# Patient Record
Sex: MALE | Race: WHITE
[De-identification: names, ages, dates, MRNs, and addresses within clinical notes are randomized per-mention and may not be internally consistent; named-entity substitution may affect disease eponyms.]

---

## 2017-05-13 ENCOUNTER — HOSPITAL ENCOUNTER (EMERGENCY)
Dept: HOSPITAL 31 - C.ER | Age: 52
Discharge: HOME | End: 2017-05-13
Payer: COMMERCIAL

## 2017-05-13 VITALS — OXYGEN SATURATION: 95 %

## 2017-05-13 VITALS
TEMPERATURE: 98.5 F | DIASTOLIC BLOOD PRESSURE: 71 MMHG | SYSTOLIC BLOOD PRESSURE: 125 MMHG | HEART RATE: 70 BPM | RESPIRATION RATE: 16 BRPM

## 2017-05-13 VITALS — BODY MASS INDEX: 27.3 KG/M2

## 2017-05-13 DIAGNOSIS — J45.909: Primary | ICD-10-CM

## 2018-04-28 ENCOUNTER — HOSPITAL ENCOUNTER (INPATIENT)
Dept: HOSPITAL 14 - H.ER | Age: 53
LOS: 10 days | Discharge: HOME | DRG: 430 | End: 2018-05-08
Attending: PSYCHIATRY & NEUROLOGY | Admitting: PSYCHIATRY & NEUROLOGY
Payer: COMMERCIAL

## 2018-04-28 VITALS — OXYGEN SATURATION: 99 %

## 2018-04-28 VITALS — BODY MASS INDEX: 27.3 KG/M2

## 2018-04-28 DIAGNOSIS — F33.3: Primary | ICD-10-CM

## 2018-04-28 DIAGNOSIS — F41.8: ICD-10-CM

## 2018-04-28 DIAGNOSIS — I10: ICD-10-CM

## 2018-04-28 DIAGNOSIS — J45.909: ICD-10-CM

## 2018-04-28 LAB
ALBUMIN SERPL-MCNC: 4.3 G/DL (ref 3.5–5)
ALBUMIN/GLOB SERPL: 1.4 {RATIO} (ref 1–2.1)
ALT SERPL-CCNC: 37 U/L (ref 21–72)
AST SERPL-CCNC: 19 U/L (ref 17–59)
BASOPHILS # BLD AUTO: 0.1 K/UL (ref 0–0.2)
BASOPHILS NFR BLD: 0.7 % (ref 0–2)
BILIRUB UR-MCNC: NEGATIVE MG/DL
BUN SERPL-MCNC: 15 MG/DL (ref 9–20)
CALCIUM SERPL-MCNC: 9.3 MG/DL (ref 8.4–10.2)
COLOR UR: YELLOW
EOSINOPHIL # BLD AUTO: 0.1 K/UL (ref 0–0.7)
EOSINOPHIL NFR BLD: 1.3 % (ref 0–4)
ERYTHROCYTE [DISTWIDTH] IN BLOOD BY AUTOMATED COUNT: 13.5 % (ref 11.5–14.5)
GFR NON-AFRICAN AMERICAN: > 60
GLUCOSE UR STRIP-MCNC: (no result) MG/DL
HGB BLD-MCNC: 16.4 G/DL (ref 12–18)
LEUKOCYTE ESTERASE UR-ACNC: (no result) LEU/UL
LYMPHOCYTES # BLD AUTO: 1.8 K/UL (ref 1–4.3)
LYMPHOCYTES NFR BLD AUTO: 17.2 % (ref 20–40)
MCH RBC QN AUTO: 29.8 PG (ref 27–31)
MCHC RBC AUTO-ENTMCNC: 33.8 G/DL (ref 33–37)
MCV RBC AUTO: 88.1 FL (ref 80–94)
MONOCYTES # BLD: 0.8 K/UL (ref 0–0.8)
MONOCYTES NFR BLD: 8.1 % (ref 0–10)
NEUTROPHILS # BLD: 7.5 K/UL (ref 1.8–7)
NEUTROPHILS NFR BLD AUTO: 72.7 % (ref 50–75)
NRBC BLD AUTO-RTO: 0.1 % (ref 0–0)
PH UR STRIP: 6 [PH] (ref 5–8)
PLATELET # BLD: 252 K/UL (ref 130–400)
PMV BLD AUTO: 8.8 FL (ref 7.2–11.7)
PROT UR STRIP-MCNC: NEGATIVE MG/DL
RBC # BLD AUTO: 5.51 MIL/UL (ref 4.4–5.9)
RBC # UR STRIP: NEGATIVE /UL
SP GR UR STRIP: 1.01 (ref 1–1.03)
URINE CLARITY: CLEAR
UROBILINOGEN UR-MCNC: 2 MG/DL (ref 0.2–1)
WBC # BLD AUTO: 10.3 K/UL (ref 4.8–10.8)

## 2018-04-28 PROCEDURE — GZHZZZZ GROUP PSYCHOTHERAPY: ICD-10-PCS | Performed by: PSYCHIATRY & NEUROLOGY

## 2018-04-28 PROCEDURE — GZ58ZZZ INDIVIDUAL PSYCHOTHERAPY, COGNITIVE-BEHAVIORAL: ICD-10-PCS | Performed by: PSYCHIATRY & NEUROLOGY

## 2018-04-28 NOTE — ED PDOC
HPI: Psych/Substance Abuse


Time Seen by Provider: 04/28/18 15:45


Chief Complaint (Nursing): Psychiatric Evaluation


Chief Complaint (Provider): Psychiatric Evaluation 


History/Exam Limitations: no limitations


Onset/Duration Of Symptoms: Other (3 weeks)


Current Symptoms Are (Timing): Still Present


Associated Symptoms: Depression


Additional Complaint(s): 


52 year old male presents to the ED for psychiatric evaluation for depression 

that began 3 weeks ago. Patient reports the following associated symptoms of 

not eating or sleeping and states "FBI is calling him". Other psychiatric 

symptoms: (+) paranoid thoughts, (+) hallucinations, (-) suicidal ideation, (-) 

homicidal ideation. Otherwise: (-) trauma, (-) fever, (-)headache, (-) dyspnea, 

(-) vomiting, (-) substance abuse, (-) patient intent of initiating a suicide 

attempt, (-) plan. 











Past Medical History


Reviewed: Historical Data, Nursing Documentation, Vital Signs


Vital Signs: 





 Last Vital Signs











Temp  98.4 F   04/28/18 15:36


 


Pulse  84   04/28/18 15:36


 


Resp  18   04/28/18 15:36


 


BP  165/93 H  04/28/18 15:36


 


Pulse Ox  99   04/28/18 15:36














- Medical History


PMH: Anxiety, Asthma, Depression, HTN


   Denies: Chronic Kidney Disease





- Family History


Family History: States: Unknown Family Hx





- Social History


Current smoker - smoking cessation education provided: No


Alcohol: Occasional


Drugs: Denies





- Immunization History


Hx Tetanus Toxoid Vaccination: No


Hx Influenza Vaccination: No


Hx Pneumococcal Vaccination: No





- Home Medications


Home Medications: 


 Ambulatory Orders











 Medication  Instructions  Recorded


 


Albuterol 0.083% [Albuterol 0.083% 2.5 mg IH Q6 PRN #20 neb 05/13/17





Inhal Sol (2.5 mg/3 ml) UD]  














- Allergies


Allergies/Adverse Reactions: 


 Allergies











Allergy/AdvReac Type Severity Reaction Status Date / Time


 


No Known Allergies Allergy   Verified 05/13/17 08:02














Review of Systems


ROS Statement: Except As Marked, All Systems Reviewed And Found Negative


Constitutional: Negative for: Fever, Other (trauma)


Respiratory: Negative for: Other (difficulty breathing)


Gastrointestinal: Negative for: Vomiting


Neurological: Positive for: Other (paranoid thoughts; hallucinations).  

Negative for: Headache


Psych: Negative for: Suicidal ideation (homicidal ideation)





Physical Exam





- Physical Exam


Comments: 


GENERAL APPEARANCE: Patient is awake, alert, oriented x 3, in no acute distress.


SKIN: Warm, dry; (-) cyanosis


HEAD: (-) scalp swelling, (-) scalp tenderness.


EYES: (-) conjunctival pallor, (-) scleral icterus, (-) nystagmus.


ENMT: Mucous membranes moist. Airway patent: (-) stridor.


NECK: (-) tenderness, (-) stiffness, (-) lymphadenopathy.


CHEST AND RESPIRATORY: (-) rales, (-) rhonchi, (-) wheezes; breath sounds equal.


ABDOMEN: Soft, (-) distention, (-) tenderness, (-) guarding.


NEURO AND PSYCH: Mental status as above.


Affect: Calm and cooperative. CNs: Intact. Pupils equal and reactive; EOMI; (-) 

facial asymmetry; tongue and uvula midline. Strength symmetric.











- Laboratory Results


Result Diagrams: 


 04/28/18 16:20





 04/28/18 16:20





- ECG


O2 Sat by Pulse Oximetry: 99 (RA)


Pulse Ox Interpretation: Normal





Medical Decision Making


Medical Decision Making: 


Time: 1617 


Initial Plan:


--EKG


--Alcohol serum 


--CMP


--Drug screen


--Crisis evaluation 


--CBC w/ differential 


--Chest one view 


--Urinalysis 


--Reevaluation 





Time: 1735


PROCEDURE:  CHEST RADIOGRAPH, 1 VIEW


FINDINGS:


LUNGS:


The lungs are well inflated.  There is mild pulmonary venous congestion. No 

focal consolidation.


PLEURA:


No pneumothorax or pleural fluid seen.


CARDIOVASCULAR:


Normal.


OSSEOUS STRUCTURES:


No significant abnormalities.


VISUALIZED UPPER ABDOMEN:


Normal.


OTHER FINDINGS:


None. 


IMPRESSION:


No active pulmonary disease.








EKG: NSR at 76 bpm, (-) acute ST changes, as read by PA. 





Labs reviewed and they are noted to be within normal limits.


Patient is medically cleared for crisis evaluation.


Patient was seen and evaluated by crisis.


After crisis evaluation, disposition was made for further inpatient treatment 

as per Dr. Wong for depression and hallucinations.


Patient and family is agreeable with plan and disposition. 


On re-evaluation, patient laying in bed comfortably in no acute distress.


--------------------------------------------------------------------------------

-----------------


Scribe Attestation:   


Documented by Norma Hawkins, acting as a scribe for Carmel Power PA-C





Provider Scribe Attestation:


All medical record entries made by the Scribe were at my direction and 

personally dictated by me. I have reviewed the chart and agree that the record 

accurately reflects my personal performance of the history, physical exam, 

medical decision making, and the department course for this patient. I have 

also personally directed, reviewed, and agree with the discharge instructions 

and disposition.








Disposition





- Clinical Impression


Clinical Impression: 


 Depression, Hallucination








- Patient ED Disposition


Is Patient to be Admitted: Yes


Counseled Patient/Family Regarding: Studies Performed, Diagnosis





- Disposition


Disposition Time: 18:00


Condition: STABLE


Forms:  [employer] (English)





- PA / NP / Resident Statement


MD/ has reviewed & agrees with the documentation as recorded.

## 2018-04-28 NOTE — RAD
PROCEDURE:  CHEST RADIOGRAPH, 1 VIEW



HISTORY:

psych eval



COMPARISON:

None available.



FINDINGS:



LUNGS:

The lungs are well inflated.  There is mild pulmonary venous 

congestion. No focal consolidation.



PLEURA:

No pneumothorax or pleural fluid seen.



CARDIOVASCULAR:

Normal.



OSSEOUS STRUCTURES:

No significant abnormalities.



VISUALIZED UPPER ABDOMEN:

Normal.



OTHER FINDINGS:

None. 



IMPRESSION:

No active pulmonary disease.

## 2018-04-28 NOTE — PCM.BM
<SandylisaJannasim P - Last Filed: 04/28/18 21:51>





Treatment Plan Problems





- Problems identified on initial assessmt


  ** Hopelessness/Helplessness


Date Initiated: 04/28/18


Time Initiated: 21:51


Assessment reference: NA


Status: Active





  ** Altered Sleep Patterns


Date Initiated: 04/28/18


Time Initiated: 21:51


Assessment reference: NA


Status: Active





Treatment assets and liabiliti


Patient Assests: physically healthy, good support system, negotiates basic needs

, cognitively intact


Patient Liabilities: relationship conflicts, language/speech





- Milieu Protocol


Maintain good personal hygiene: daily Encourage regular showers, daily Remind 

patient to perform daily oral care, daily Assist patient to perform ADL's


Conduct patient checks and document Observation sheet: Q15 minutes


Maintain personal safety: every shift Educate patient to report safety concerns 

to staff, every shift Monitor environment for contraband/sharps


Medication safety: Monitor for expected outcome, potential side effects: every 

shift, Assess barriers to learning: every shift, Assess readiness for 

medication education: every shift





<MarzenaJulissa kline - Last Filed: 05/02/18 16:14>





Treatment assets and liabiliti


Patient Assests: adapts well, cooperative, insightful (limited ; noncompliant 

with aftercare), motivated, resourceful, ADL independent, physically healthy, 

good support system, negotiates basic needs, good past tx response, cognitively 

intact


Patient Liabilities: language/speech (primarily [place] speaking)





Family Contact


Family involvement: Family/SO is involved


Family contact: Patient agrees to contact, Family has been contacted by patient

, Telephone contact initiated by staff


Family contact name: Rita (wife)([phone])


Family contacted how many times per week?: 2


Family contact comment: Writer placed call to patients wife/primary support to 

discuss progress on 3NP, discharge planning and address faily concerns. 

Voicemail for this number ha snot been set up and writer was unable to leave 

voicemail. Writer will attempt call at later time.





- Goals for Treatment


Patient goals for treatment: Patient to continue stabilization on 3NP through 

medication management and group/supportive therapy. Patient to be encouraged to 

attend groups regularly to promote self-awareness, reality testing, and improve 

insight, compliance, coping skills and self-esteem. Patient to be provided with 

referral for appropriate level of aftercare to reduce risk of future 

hospitalizations and ensure safety in the community.





Discharge/Continuing Care





- Education Needs


Education Needs: Family Medication, Family Diagnosis/Disease Process, Family 

Coping Skills, Family Community resources, Family Aftercare Safety Plan, 

Patient Medication, Patient Diagnosis/Disease Process, Patient Coping Skills, 

Patient Community resources, Patient Aftercare Safety Plan





- Discharge


Discharge Criteria: Tolerates medication w/o severe side effects, Free of 

paranoid thoughts, Free of agitation, Normal sleep pattern, Ability to care for 

self, Reduction of target symptoms


Discharge to:: Home, With Family





- Treatment Team Participation


Patient/Family/SO Statement: 





05/02/18 16:18


Pt. remains internally preoccupied and suspicious on approach but to a lesser 

degree than upon admission. Pt. brighter and more visible on 3NP. Insight and 

focused are limited. Pt. continues to express paranoia and delusions pertaining 

to the FBI. Pt. mostly withdrawn and isolative on 3NP but to a lesser degree 

than upon admission. Pt. reported some improvement in symptoms of depression 

and anxiety since admission. Pt reports improvement in sleep.





Discussed with Family/SO: No


Was Patient/Family/SO present at Treatment Team Meeting: Yes





<Jasson Brewer - Last Filed: 05/03/18 14:09>





- Diagnosis


(1) Depression


Status: Acute   


Interventions: 





05/03/18 14:08


pharmacotherapy, psychotherapy

## 2018-04-29 LAB
HDLC SERPL-MCNC: 46 MG/DL (ref 30–70)
LDLC SERPL-MCNC: 140 MG/DL (ref 0–129)
T4 SERPL-MCNC: 9.65 UG/DL (ref 5.5–11)

## 2018-04-29 NOTE — PCM.PSYCH
Initial Psychiatric Evaluation





- Initial Psychiatric Evaluation


Chief Complaint (in patient's own words): 





came to emergency room with wife for worsening depression and anxiety with 

paranoia


Patient's Reaction to Hospitalization: 





pt signed in voluntarily


History of Present Illness and Precipitating Events: 





several weeks ago increasing depression anxiety paranoia after reportedly 

receiving call from fbi related to reported call pt made to a telephone hotline 

for exotic women. after reported call pt became increasingly worried not want 

to leave house thinking people were following him, feeling down, not wantting 

to eat. reports that expresses concern that although does not have monalisa 

suicidal plan is concerned about his worsening mood as was reported to er staff 

by wife per report. pt was previously treated at [facility] for 

depression and anxiety. reportedly was given medications, felt better and 

stopped on his own after a period of working and having fears of "becoming 

addicted to medications".


Current Medications: 





Active Medications











Generic Name Dose Route Start Last Admin





  Trade Name Freq  PRN Reason Stop Dose Admin


 


Acetaminophen  650 mg  04/28/18 20:50  





  Tylenol 325mg Tab  PO   





  Q4 PRN   





  pain level 4-7   


 


Al Hydrox/Mg Hydrox/Simethicone  30 ml  04/28/18 20:50  





  Maalox Plus 30 Ml  PO   





  Q4 PRN   





  Dyspepsia   


 


Albuterol Sulfate  2.5 mg  04/28/18 21:10  





  Albuterol 0.083% Inhal Sol (2.5 Mg/3 Ml) Ud  IH   





  RQ6 PRN   





  Wheezing   


 


Diphenhydramine HCl  50 mg  04/28/18 20:50  





  Benadryl  IM   





  Q6 PRN   





  Extrapyramidal S/S Unable PO   


 


Diphenhydramine HCl  50 mg  04/28/18 20:50  





  Benadryl  PO   





  Q6 PRN   





  Extrapyramidal Symptoms   


 


Diphenhydramine HCl  50 mg  04/28/18 20:54  





  Benadryl  PO   





  HS PRN   





  Sleep   


 


Escitalopram Oxalate  5 mg  04/29/18 14:45  





  Lexapro  PO   





  DAILY YURIY   


 


Haloperidol  5 mg  04/28/18 20:50  





  Haldol  PO   





  Q4 PRN   





  Agitation   


 


Haloperidol Lactate  5 mg  04/28/18 20:50  





  Haldol  IM   





  Q4 PRN   





  Agitation, Unable to Take PO   


 


Hydrochlorothiazide  25 mg  04/29/18 12:30  





  Hydrodiuril  PO   





  DAILY YURIY   


 


Lorazepam  2 mg  04/28/18 20:50  





  Ativan  IM   





  Q4 PRN   





  Anxiety/Agitation,Unable PO   


 


Lorazepam  2 mg  04/28/18 20:50  





  Ativan  PO   





  Q4 PRN   





  Anxiety/Agitation   


 


Magnesium Hydroxide  30 ml  04/28/18 20:50  





  Milk Of Magnesia  PO   





  HS PRN   





  Constipation   


 


Quetiapine Fumarate  25 mg  04/29/18 22:00  





  Seroquel  PO   





  HS YURIY   














Past Psychiatric History





- Past Psychiatric History


Prior Professional Help: opd [facility] depression anxietty 


Prior Psychiatric Treatment: stopped on his own without follow up 


History of Abuse: 





denies


History of ETOH/Drug Use: 





denies


History of Family Illness: 





denies


Pertinent Medical Hx (Current Medical&Sleep Prob, Allergies): 





 Allergies











Allergy/AdvReac Type Severity Reaction Status Date / Time


 


No Known Allergies Allergy   Verified 05/13/17 08:02








 





Albuterol 0.083% [Albuterol 0.083% Inhal Sol (2.5 mg/3 ml) UD] 2.5 mg IH Q6 PRN 

#20 neb 05/13/17 











Review of Systems





- Psychiatric


Psychiatric: Abnormal Sleep Pattern, Anhedonia, Change in Appetite, Hopelessness

, Paranoia





Mental Status Examination





- Personal Presentation


Personal Presentation: Looks older than stated age





- Affect


Affect: Constricted, Depressed





- Motor Activity


Motor Activity: Calm, Psychomotor Retardation





- Reliability in Providing Information


Reliability in Providing Information: Fair





- Speech


Speech: Organized





- Mood


Mood: Depressed





- Formal Thought Process


Formal Thought Process: Paranoia





- Obsessions/Compulsions


Obsessions: No


Compulsions: No





- Cognitive Functions


Orientation: Person, Place, Situation, Time


Sensorium: Alert


Attention/Concentration: Attentive


Judgement: Imparied, as evidence by: Lack of insight into illness





- Risk


Risk: Suicidal, Diminished functioning





- Strength & Assets Inventory


Strength & Assets Inventory: Family support (hx of non adherence), Cooperative





DSM 5 DX





- DSM 5


DSM 5 Diagnosis: 





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


  





- Recommended/Plan of Treatment


Treatment Recommendations and Plan of Treatment: 





inpt admission per attending


vital signs and clinical assessment per status and protocol


hospitalist consult


prns per unit protocl


start seroquel 25mg po hs


lexapro 5mg po am


team to obtain collaborative information in am


discharge planning in progress  


Projected ELOS: 5-7 days


Prognosis: guarded


Discharge Plan and Discharge Criteria: 





safety





- Smoking Cessation


Smoking Cessation Initiated: No


Reason for not providing: pt defers

## 2018-04-29 NOTE — CP.PCM.CON
History of Present Illness





- History of Present Illness


History of Present Illness: 





This is a 51 yo male with a past medical history of asthma, hypertension, 

anxiety, and depression, who presented to the ED with worsening depression, 

paranoid thoughts, hallucinations. The patient is very withdrawn and does not 

respond to questioning. Has not been taking any medication for his 

hypertension. 





Review of Systems





- Review of Systems


Systems not reviewed;Unavailable: Uncooperative





Past Patient History





- Infectious Disease


Hx of Infectious Diseases: None





- Tetanus Immunizations


Tetanus Immunization: Unknown





- Past Medical History & Family History


Past Medical History?: Yes





- Past Social History


Alcohol: Occasional


Drugs: Denies





- CARDIAC


Hx Cardiac Disorders: No





- PULMONARY


Hx Respiratory Disorders: Yes


Hx Asthma: Yes





- NEUROLOGICAL


Hx Neurological Disorder: No





- HEENT


Hx HEENT Problems: No





- RENAL


Hx Chronic Kidney Disease: No





- ENDOCRINE/METABOLIC


Hx Endocrine Disorders: No





- HEMATOLOGICAL/ONCOLOGICAL


Hx Blood Disorders: No





- INTEGUMENTARY


Hx Dermatological Problems: No





- MUSCULOSKELETAL/RHEUMATOLOGICAL


Hx Musculoskeletal Disorders: No


Hx Falls: No





- GASTROINTESTINAL


Hx Gastrointestinal Disorders: No





- GENITOURINARY/GYNECOLOGICAL


Hx Genitourinary Disorders: No





- PSYCHIATRIC


Hx Substance Use: No





- SURGICAL HISTORY


Hx Surgeries: No





- ANESTHESIA


Hx Anesthesia: No





Meds


Allergies/Adverse Reactions: 


 Allergies











Allergy/AdvReac Type Severity Reaction Status Date / Time


 


No Known Allergies Allergy   Verified 05/13/17 08:02














- Medications


Medications: 


 Current Medications





Acetaminophen (Tylenol 325mg Tab)  650 mg PO Q4 PRN


   PRN Reason: pain level 4-7


Al Hydrox/Mg Hydrox/Simethicone (Maalox Plus 30 Ml)  30 ml PO Q4 PRN


   PRN Reason: Dyspepsia


Albuterol Sulfate (Albuterol 0.083% Inhal Sol (2.5 Mg/3 Ml) Ud)  2.5 mg IH RQ6 

PRN


   PRN Reason: Wheezing


Diphenhydramine HCl (Benadryl)  50 mg IM Q6 PRN


   PRN Reason: Extrapyramidal S/S Unable PO


Diphenhydramine HCl (Benadryl)  50 mg PO Q6 PRN


   PRN Reason: Extrapyramidal Symptoms


Diphenhydramine HCl (Benadryl)  50 mg PO HS PRN


   PRN Reason: Sleep


Haloperidol (Haldol)  5 mg PO Q4 PRN


   PRN Reason: Agitation


Haloperidol Lactate (Haldol)  5 mg IM Q4 PRN


   PRN Reason: Agitation, Unable to Take PO


Hydrochlorothiazide (Hydrodiuril)  25 mg PO DAILY YURIY


Lorazepam (Ativan)  2 mg IM Q4 PRN


   PRN Reason: Anxiety/Agitation,Unable PO


Lorazepam (Ativan)  2 mg PO Q4 PRN


   PRN Reason: Anxiety/Agitation


Magnesium Hydroxide (Milk Of Magnesia)  30 ml PO HS PRN


   PRN Reason: Constipation











Physical Exam





- Additional Findings


Additional findings: 








Physical exam:





Constitutional- not responding to questioning due to psychiatric disorder, awake

, alert


Head- NCAT, PERRL


Eye- PERRL, EOMI


ENT- normal exam, MMM.


Neck- normal inspection, supple, no JVD


Respiratory- CTAB, no wheezes rales rhonchi


Cardiovascular- RRR, +S1, +S2 no MRG


GI/Abdominal- normal bowel sounds, soft, no mass, no hsm


Skin- warm, dry


Extremities Exam- normal capillary refill, normal inspection


Neurological Exam- alert, awake, oriented


Psych- depressed mood, flat affect





Results





- Vital Signs


Recent Vital Signs: 


 Last Vital Signs











Temp  98.1 F   04/28/18 20:48


 


Pulse  74   04/28/18 21:22


 


Resp  18   04/28/18 21:22


 


BP  138/75   04/28/18 20:48


 


Pulse Ox  99   04/28/18 20:48














- Labs


Result Diagrams: 


 04/28/18 16:20





 04/28/18 16:20


Labs: 


 Laboratory Results - last 24 hr











  04/28/18 04/28/18 04/28/18





  16:20 16:20 16:20


 


WBC   10.3 


 


RBC   5.51 


 


Hgb   16.4 


 


Hct   48.6 


 


MCV   88.1 


 


MCH   29.8 


 


MCHC   33.8 


 


RDW   13.5 


 


Plt Count   252 


 


MPV   8.8 


 


Neut % (Auto)   72.7 


 


Lymph % (Auto)   17.2 L 


 


Mono % (Auto)   8.1 


 


Eos % (Auto)   1.3 


 


Baso % (Auto)   0.7 


 


Neut # (Auto)   7.5 H 


 


Lymph # (Auto)   1.8 


 


Mono # (Auto)   0.8 


 


Eos # (Auto)   0.1 


 


Baso # (Auto)   0.1 


 


Sodium  142  


 


Potassium  3.8  


 


Chloride  98  


 


Carbon Dioxide  25  


 


Anion Gap  23 H  


 


BUN  15  


 


Creatinine  0.8  


 


Est GFR ( Amer)  > 60  


 


Est GFR (Non-Af Amer)  > 60  


 


Random Glucose  113 H  


 


Calcium  9.3  


 


Total Bilirubin  0.5  


 


AST  19  


 


ALT  37  


 


Alkaline Phosphatase  67  


 


Total Protein  7.4  


 


Albumin  4.3  


 


Globulin  3.1  


 


Albumin/Globulin Ratio  1.4  


 


Triglycerides   


 


Cholesterol   


 


LDL Cholesterol Direct   


 


HDL Cholesterol   


 


Thyroxine (T4)   


 


TSH 3rd Generation   


 


Urine Color   


 


Urine Clarity   


 


Urine pH   


 


Ur Specific Gravity   


 


Urine Protein   


 


Urine Glucose (UA)   


 


Urine Ketones   


 


Urine Blood   


 


Urine Nitrate   


 


Urine Bilirubin   


 


Urine Urobilinogen   


 


Ur Leukocyte Esterase   


 


Urine RBC (Auto)   


 


Urine Microscopic WBC   


 


Urine Opiates Screen    Negative


 


Urine Methadone Screen    Negative


 


Ur Barbiturates Screen    Negative


 


Ur Phencyclidine Scrn    Negative


 


Ur Amphetamines Screen    Negative


 


U Benzodiazepines Scrn    Negative


 


U Oth Cocaine Metabols    Negative


 


U Cannabinoids Screen    Negative


 


Alcohol, Quantitative  < 10  














  04/28/18 04/29/18





  16:20 09:40


 


WBC  


 


RBC  


 


Hgb  


 


Hct  


 


MCV  


 


MCH  


 


MCHC  


 


RDW  


 


Plt Count  


 


MPV  


 


Neut % (Auto)  


 


Lymph % (Auto)  


 


Mono % (Auto)  


 


Eos % (Auto)  


 


Baso % (Auto)  


 


Neut # (Auto)  


 


Lymph # (Auto)  


 


Mono # (Auto)  


 


Eos # (Auto)  


 


Baso # (Auto)  


 


Sodium  


 


Potassium  


 


Chloride  


 


Carbon Dioxide  


 


Anion Gap  


 


BUN  


 


Creatinine  


 


Est GFR ( Amer)  


 


Est GFR (Non-Af Amer)  


 


Random Glucose  


 


Calcium  


 


Total Bilirubin  


 


AST  


 


ALT  


 


Alkaline Phosphatase  


 


Total Protein  


 


Albumin  


 


Globulin  


 


Albumin/Globulin Ratio  


 


Triglycerides   137


 


Cholesterol   212 H


 


LDL Cholesterol Direct   140 H


 


HDL Cholesterol   46


 


Thyroxine (T4)   9.65


 


TSH 3rd Generation   0.46


 


Urine Color  Yellow 


 


Urine Clarity  Clear 


 


Urine pH  6.0 


 


Ur Specific Gravity  1.015 


 


Urine Protein  Negative 


 


Urine Glucose (UA)  Neg 


 


Urine Ketones  Negative 


 


Urine Blood  Negative 


 


Urine Nitrate  Negative 


 


Urine Bilirubin  Negative 


 


Urine Urobilinogen  2.0 


 


Ur Leukocyte Esterase  Neg 


 


Urine RBC (Auto)  3 


 


Urine Microscopic WBC  < 1 


 


Urine Opiates Screen  


 


Urine Methadone Screen  


 


Ur Barbiturates Screen  


 


Ur Phencyclidine Scrn  


 


Ur Amphetamines Screen  


 


U Benzodiazepines Scrn  


 


U Oth Cocaine Metabols  


 


U Cannabinoids Screen  


 


Alcohol, Quantitative  














Assessment & Plan





- Assessment and Plan (Free Text)


Plan: 





ASSESSMENT/PLAN





1) Essential hypertension, uncontrolled


- no seen antihypertensive medication in the past in the chart


- Start HCTZ 25 mg po daily and titrate as necessary


- also may be exacerbated by anxiety





2) Asthma- chronic, stable


- Continue Ventolin inhaler PRN


- well controlled





3) Anxiety/depression


- Management as per psychiatry

## 2018-04-30 NOTE — CARD
--------------- APPROVED REPORT --------------





EKG Measurement

Heart [id number]

MS [id number]

[id number]

[id number]

[id number]



<Conclusion>

Normal sinus rhythm

Normal ECG

## 2018-04-30 NOTE — PCM.PYCHPN
Psychiatric Progress Note





- Psychiatric Progress Note


Patient seen today, length of contact: pt evaluated discussed with team chart 

reviewed


Patient Chief Complaint: 





I am worried they are following me, I am afraid and scared  


Problems Identified/Issues Discussed: 





pt evaluated with translation


pt seen in bed , dressed in hospital gown ,psychomotor retardation,neglecting 

personal hygiene , pt reported feeling depressed and anxious, , discussed with 

pt possible provoking factors for anxiety stated that he continues to worry 

about the [employer] following him, pt continues to be guarded , paranoid , reported 

poor sleep with early insomnia , poor appetite, pt presenting with paucity of 

thought, internal preoccupation, he denied command hallucinations denied 

suicidal or homicidal ideations


DSM 5 Symptoms Update: 





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


Medication Change: Yes (start remeron and risperidone)


Medical Record Reviewed: Yes





Mental Status Examination





- Cognitive Function


Orientation: Person, Place, Situation


Memory: Impaired, Recent


Attention: Poor


Concentration: Poor


Association: WNL


Fund of Knowledge: Poor


Decription of patient's judgement and insights: 





fair insight and judgment





- Mood


Mood: Depressed, Anxious





- Affect


Affect: Constricted, Depressed





- Speech


Speech: Soft





- Formal Thought Process


Formal Thought Process: Delusions, Paranoia


Psychotic Thoughts and Behaviors: 





delusions of persecution, internal preoccupation , denied command hallucinations





- Suicidal Ideation


Suicidal Ideation: No





- Homicidal Ideation


Homicidal Ideation: No





Goal/Treatment Plan





- Goal/Treatment Plan


Progress Toward Problem(s) and Goals/Treatment Plan: 


discontinue seroquel and lexapro


start risperidone 0.5mg bid and uptitrate


start remeron 7.5 mg qhs and uptitrate


monitor for psychopharmacological effects and side effect profile





Estimated Date of D/C: 05/04/18

## 2018-05-01 RX ADMIN — RISPERIDONE SCH MG: 0.5 TABLET, ORALLY DISINTEGRATING ORAL at 09:21

## 2018-05-01 NOTE — PCM.PYCHPN
Psychiatric Progress Note





- Psychiatric Progress Note


Patient seen today, length of contact: pt evaluated discussed with team chart 

reviewed


Patient Chief Complaint: 





I know the police is trying to get me


Problems Identified/Issues Discussed: 





pt evaluated with translation


pt seen in bed , continues to be isolative, paranoid ,  continues to have 

delusions of persecution believing that police is after him , dressed in 

hospital gown not attending to personal hygiene ,psychomotor retardation, pt 

reported feeling less depressed and less anxious, , improved sleep about 5 

hours and improved  appetite, he is  presenting with paucity of thought, under 

productive speech,  internal preoccupation, he denied command hallucinations 

denied suicidal or homicidal ideations


compliant with medication , no reported side effects of medications, discussed 

with pt increasing dose of risperidone and remeron


encouraged pt to attend groups


DSM 5 Symptoms Update: 





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


Medication Change: Yes (increase remeron and risperidone)


Medical Record Reviewed: Yes





Mental Status Examination





- Cognitive Function


Orientation: Person, Place, Situation


Memory: Impaired, Recent


Attention: Poor


Concentration: Poor


Association: WNL


Fund of Knowledge: Poor


Decription of patient's judgement and insights: 





fair insight and judgment





- Mood


Mood: Depressed, Anxious





- Affect


Affect: Constricted, Depressed





- Speech


Speech: Soft





- Formal Thought Process


Formal Thought Process: Delusions, Paranoia


Psychotic Thoughts and Behaviors: 





delusions of persecution, internal preoccupation , denied command hallucinations





- Suicidal Ideation


Suicidal Ideation: No





- Homicidal Ideation


Homicidal Ideation: No





Goal/Treatment Plan





- Goal/Treatment Plan


Need for Continued Stay: Severe depression anxiety, Discharge may exacerbated 

symptoms


Progress Toward Problem(s) and Goals/Treatment Plan: 





increase  risperidone 0.5mg daily and 1mg qhs


increase  remeron 15 mg qhs 


monitor for psychopharmacological effects and side effect profile





Estimated Date of D/C: 05/04/18

## 2018-05-02 RX ADMIN — RISPERIDONE SCH MG: 0.5 TABLET, ORALLY DISINTEGRATING ORAL at 09:00

## 2018-05-02 NOTE — PCM.PYCHPN
Psychiatric Progress Note





- Psychiatric Progress Note


Patient seen today, length of contact: pt evaluated discussed with team chart 

reviewed


Patient Chief Complaint: 





I still feel somebody on the outside is there to get me


Problems Identified/Issues Discussed: 





pt  seen with treatment team , continues to be in hospital gown, reporting 

sleep now is about 5 hours a night , some improvement in appetite , pt 

continues to verbalize delusions of persecution , continues to feel targeted by 

police for the phone calls he made, discussed with pt increasing dose of 

risperidone , 


pt reported dizziness in the morning possible postural hypotension, advised pt 

to avoid sudden change in posture , also will shift medications tonight dose to 

help with insomnia


pt continues to be isolative , needs a lot of encouragement to be out of his 

room, neglecting his personal hygiene


speech is more productive , no noted paucity, denied perceptual disturbances, 

denied any suicidal or homicidal ideations 





DSM 5 Symptoms Update: 





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


Medication Change: Yes (increase  risperidone)


Medical Record Reviewed: Yes





Mental Status Examination





- Cognitive Function


Orientation: Person, Place, Situation


Memory: Impaired, Recent


Attention: WNL


Concentration: WNL


Association: WNL


Fund of Knowledge: WNL


Decription of patient's judgement and insights: 





fair insight and judgment





- Mood


Mood: Depressed, Anxious





- Affect


Affect: Constricted, Depressed





- Speech


Speech: Soft





- Formal Thought Process


Formal Thought Process: Delusions, Paranoia


Psychotic Thoughts and Behaviors: 





delusions of persecution, , denied command hallucinations





- Suicidal Ideation


Suicidal Ideation: No





- Homicidal Ideation


Homicidal Ideation: No





Goal/Treatment Plan





- Goal/Treatment Plan


Need for Continued Stay: Severe depression anxiety, Discharge may exacerbated 

symptoms


Progress Toward Problem(s) and Goals/Treatment Plan: 





increase  risperidone 2mg qhs 


 remeron 15 mg qhs 


monitor for psychopharmacological effects and side effect profile





Estimated Date of D/C: 05/07/18

## 2018-05-03 NOTE — PCM.PYCHPN
Psychiatric Progress Note





- Psychiatric Progress Note


Patient seen today, length of contact: pt evaluated discussed with team chart 

reviewed


Patient Chief Complaint: 





I still feel the police is out to get me for what I did


Problems Identified/Issues Discussed: 


pt evaluated with translation


pt on evaluation reported improved sleep about 6to 7hours, stated feeling less 

depressed. pt however continues to have delusions of persecution, afraid that 

the police will be after him on the outside, discussed with pt that this 

overvalued idea probably stems from feeling guilty about the episode of 

infidelity he had towards his wife through a phne call, pt paused and then 

agreed


discussed with pt increasing the dose of risperidone to 2.5mg qhs


pt reported that after changing all medications to bed time he has no dizziness 

during the morning hours


denied perceptual disturbances, denied any current suicidal or homicidal 

ideations 


DSM 5 Symptoms Update: 





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


Medication Change: Yes (increase  risperidone)


Medical Record Reviewed: Yes





Mental Status Examination





- Cognitive Function


Orientation: Person, Place, Situation


Memory: Impaired, Recent


Attention: WNL


Concentration: WNL


Association: WNL


Fund of Knowledge: WNL


Decription of patient's judgement and insights: 





fair insight and judgment





- Mood


Mood: Depressed, Anxious





- Affect


Affect: Constricted, Depressed





- Speech


Speech: Soft





- Formal Thought Process


Formal Thought Process: Delusions, Paranoia


Psychotic Thoughts and Behaviors: 





delusions of persecution, , denied command hallucinations





- Suicidal Ideation


Suicidal Ideation: No





- Homicidal Ideation


Homicidal Ideation: No





Goal/Treatment Plan





- Goal/Treatment Plan


Need for Continued Stay: Severe depression anxiety, Discharge may exacerbated 

symptoms


Progress Toward Problem(s) and Goals/Treatment Plan: 





increase  risperidone 2.5mg qhs 


 remeron 15 mg qhs 


monitor for psychopharmacological effects and side effect profile





Estimated Date of D/C: 05/07/18

## 2018-05-04 RX ADMIN — RISPERIDONE SCH MG: 1 TABLET, ORALLY DISINTEGRATING ORAL at 21:27

## 2018-05-04 NOTE — PCM.PYCHPN
Psychiatric Progress Note





- Psychiatric Progress Note


Patient seen today, length of contact: pt evaluated discussed with team chart 

reviewed


Patient Chief Complaint: 





I am worried to be followed on the outside


Problems Identified/Issues Discussed: 


pt evaluated with translation


pt on evaluation reported improved sleep about 6to 7hours, stated feeling less 

depressed, pt is less isolative in his room, more visible on the unit  pt 

however continues to have delusions of persecution, afraid that the police will 

be after him on the outside, 


discussed with pt increasing the dose of risperidone to 3mg qhs


pt reported that after changing all medications to bed time he has no dizziness 

during the morning hours


denied perceptual disturbances, denied any current suicidal or homicidal 

ideation 


DSM 5 Symptoms Update: 





major depression with psychotic features


Medication Change: Yes (increase  risperidone)


Medical Record Reviewed: Yes





Mental Status Examination





- Cognitive Function


Orientation: Person, Place, Situation


Memory: Impaired, Recent


Attention: WNL


Concentration: WNL


Association: WNL


Fund of Knowledge: [facility]


Decription of patient's judgement and insights: 





fair insight and judgment





- Mood


Mood: Depressed, Anxious





- Affect


Affect: Constricted, Depressed





- Speech


Speech: Soft





- Formal Thought Process


Formal Thought Process: Delusions, Paranoia


Psychotic Thoughts and Behaviors: 





delusions of persecution, , denied command hallucinations





- Suicidal Ideation


Suicidal Ideation: No





- Homicidal Ideation


Homicidal Ideation: No





Goal/Treatment Plan





- Goal/Treatment Plan


Need for Continued Stay: Severe depression anxiety, Discharge may exacerbated 

symptoms


Progress Toward Problem(s) and Goals/Treatment Plan: 





increase  risperidone 3mg qhs 


 remeron 15 mg qhs 


monitor for psychopharmacological effects and side effect profile





Estimated Date of D/C: 05/07/18

## 2018-05-05 VITALS — RESPIRATION RATE: 18 BRPM

## 2018-05-05 RX ADMIN — RISPERIDONE SCH MG: 1 TABLET, ORALLY DISINTEGRATING ORAL at 21:02

## 2018-05-05 NOTE — PCM.PYCHPN
Psychiatric Progress Note





- Psychiatric Progress Note


Patient seen today, length of contact: Patient evaluated, case discussed with 

team, chart reviewed


Patient Chief Complaint: 


"I'm anxious."


Problems Identified/Issues Discussed: 


Patient reports that he continues to feel anxious and depressed.   He denies 

acute AH/VH/paranoia or delusions that he is being persecuted by the police.  

He reports that the medications make him feel sleepy at night, but report that 

this helps him sleep. No other adverse effects to medications reported.  He is 

more goal oriented towards being discharged and returning home to his family.


Medication Change: No


Medical Record Reviewed: Yes


Consults ordered or reviewed: 


Medicine consult





Mental Status Examination





- Cognitive Function


Orientation: Person, Place, Situation, Time


Memory: Impaired, Recent


Attention: WNL


Concentration: WNL


Association: WNL


Fund of Knowledge: WNL


Decription of patient's judgement and insights: 


Improving I/J





- Mood


Mood: Depressed, Anxious





- Affect


Affect: Constricted, Depressed





- Speech


Speech: Soft





- Formal Thought Process


Formal Thought Process: No Impairment (Denies acute paranoia/delusions)





- Suicidal Ideation


Suicidal Ideation: No





- Homicidal Ideation


Homicidal Ideation: No





Goal/Treatment Plan





- Goal/Treatment Plan


Need for Continued Stay: Severe depression anxiety, Discharge may exacerbated 

symptoms


Progress Toward Problem(s) and Goals/Treatment Plan: 


Major Depressive Disorder w/ Psychotic Features





-Continue Remeron 15 mg PO HS and Risperdal 3 mg PO HS


-Individual and group therapy


-Disposition planning


-Medicine consult


-Psychoeducation


Estimated Date of D/C: 05/07/18

## 2018-05-06 RX ADMIN — RISPERIDONE SCH MG: 1 TABLET, ORALLY DISINTEGRATING ORAL at 21:08

## 2018-05-06 NOTE — PCM.PYCHPN
Psychiatric Progress Note





- Psychiatric Progress Note


Patient seen today, length of contact: Patient evaluated, case discussed with 

team, chart reviewed


Patient Chief Complaint: 


"I'm anxious."


Problems Identified/Issues Discussed: 


Patient reports that his mood is improving.  He is goal oriented towards being 

discharged. He has some mild paranoia about the police.  No AH/VH/SI/HI.


Medication Change: No


Medical Record Reviewed: Yes


Consults ordered or reviewed: 


Medicine consult





Mental Status Examination





- Cognitive Function


Orientation: Person, Place, Situation, Time


Memory: Intact


Attention: WNL


Concentration: WNL


Association: WNL


Fund of Knowledge: [facility]


Decription of patient's judgement and insights: 


Improving I/J





- Mood


Mood: Anxious





- Affect


Affect: Constricted





- Speech


Speech: Soft





- Formal Thought Process


Formal Thought Process: Paranoia


Psychotic Thoughts and Behaviors: 


+Mild paranoia





- Suicidal Ideation


Suicidal Ideation: No





- Homicidal Ideation


Homicidal Ideation: No





Goal/Treatment Plan





- Goal/Treatment Plan


Need for Continued Stay: Severe depression anxiety, Discharge may exacerbated 

symptoms


Progress Toward Problem(s) and Goals/Treatment Plan: 


Major Depressive Disorder w/ Psychotic Features





-Continue Remeron 15 mg PO HS and Risperdal 3 mg PO HS


-Individual and group therapy


-Disposition planning


-Medicine consult


-Psychoeducation


Estimated Date of D/C: 05/08/18

## 2018-05-07 VITALS — TEMPERATURE: 97 F

## 2018-05-07 RX ADMIN — RISPERIDONE SCH MG: 1 TABLET, ORALLY DISINTEGRATING ORAL at 21:07

## 2018-05-07 NOTE — PCM.PYCHPN
Psychiatric Progress Note





- Psychiatric Progress Note


Patient seen today, length of contact: Patient evaluated, case discussed with 

team, chart reviewed


Patient Chief Complaint: 





I am worried to be followed on the outside


Problems Identified/Issues Discussed: 


pt evaluated with translation


pt on evaluation reported improved mood, presenting with brighter affect, kempt

, more interactive with other patients  and staff, less paranoid clearing off 

of the persecutory  delusions, improved sleep and appetite , no reported side 

effects of medications


denied perceptual disturbances, denied any current suicidal or homicidal 

ideation 


DSM 5 Symptoms Update: 





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


Medication Change: No


Medical Record Reviewed: Yes





Mental Status Examination





- Cognitive Function


Orientation: Person, Place, Situation, Time


Memory: Intact


Attention: WNL


Concentration: WNL


Association: WNL


Fund of Knowledge: WNL





- Mood


Mood: Anxious





- Affect


Affect: Constricted





- Speech


Speech: Soft





- Formal Thought Process


Formal Thought Process: Paranoia


Psychotic Thoughts and Behaviors: 





pt less paranoid, denied any current perceptual disturbances





- Suicidal Ideation


Suicidal Ideation: No





- Homicidal Ideation


Homicidal Ideation: No





Goal/Treatment Plan





- Goal/Treatment Plan


Need for Continued Stay: Severe depression anxiety, Discharge may exacerbated 

symptoms


Progress Toward Problem(s) and Goals/Treatment Plan: 





  risperidone 3mg qhs 


 remeron 15 mg qhs 


monitor for psychopharmacological effects and side effect profile





Estimated Date of D/C: 05/08/18

## 2018-05-08 VITALS — DIASTOLIC BLOOD PRESSURE: 60 MMHG | HEART RATE: 80 BPM | SYSTOLIC BLOOD PRESSURE: 108 MMHG

## 2018-05-08 NOTE — PCM.PYCHDC
Mental Status Examination





- Mental Status Examination


Orientation: Person, Place, Situation


Memory: Intact


Mood: Neutral


Affect: Broad


Speech: Appropriate


Attention: WNL


Concentration: WNL


Association: WNL


Fund of Knowledge: WNL


Formal Thought Process: No Impairment


Description of patient's judgement and insight: 





fair insight and judgment


Psychotic Thoughts and Behaviors: 





pton discharge denied any current psychotic symptoms, non elicited


Suicidal Ideation: No


Current Homicidal Ideation?: No





Discharge Summary





- Discharge Note


Reason for Hospitalization: 





several weeks ago increasing depression anxiety paranoia after reportedly 

receiving call from [employer]bi related to reported call pt made to a telephone hotline 

for exotic women. after reported call pt became increasingly worried not want 

to leave house thinking people were following him, feeling down, not wantting 

to eat. reports that expresses concern that although does not have monalisa 

suicidal plan is concerned about his worsening mood as was reported to er staff 

by wife per report. pt was previously treated at [facility] for 

depression and anxiety. reportedly was given medications, felt better and 

stopped on his own after a period of working and having fears of "becoming 

addicted to medications".





Consultations:: List each consultation separately and include:  1. Reason for 

request.  2. Findings.  3. Follow-up


Summary of Hospital Course include:: 1. Description of specific treatment plan 

utilized for patients during their course of treatmen.  2. Summarize the time-

course for resolution of acute symptoms and/or regressed behaviors.  3. 

Describe issues identified and worked on during hospitalization.  4. Describe 

medication utilized.  5. Describe medical problems identified and treated.  6. 

Reassessment of suicide risk


Summary of Hospital Course: 


pt on admission was started on risperidone , it was uptitrated to 3mg qhs


also was started on remeron , it was uptitrated to 15mg qhs


pt was initialy guarded paranoid, isolative, gradually pt presented with 

improved sleep, improved appetite, presented with less depressed mood and 

brighter affect with clearing off of the paranoid delusions


CBT and supportive therapy provided, no reported side effects of medications


on discharge pt mental status was stable, denied any suicidal or homicidal 

ideation, no psychotic symptoms elicited ,








- Diagnosis


(1) Depression


Status: Acute   





- Final Diagnosis (DSM 5)


Condition upon Discharge: STABLE


DSM 5: 





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


Disposition: HOME/ ROUTINE


Follow-up Treatment Plan: 





  risperidone 3mg qhs 


 remeron 15 mg qhs 


monitor for psychopharmacological effects and side effect profile





Prescriptions/Medication Reconciliation: 


Mirtazapine [Remeron] 15 mg PO HS 30 Days #30 tab


Risperidone [Risperdal M-TAB] 3 mg PO HS 30 Days #30 odt





- Antipsychotic Medications


Pt discharged on 2 or more routine antipsychotic medications: No